# Patient Record
Sex: FEMALE | Race: WHITE | NOT HISPANIC OR LATINO | Employment: STUDENT | URBAN - METROPOLITAN AREA
[De-identification: names, ages, dates, MRNs, and addresses within clinical notes are randomized per-mention and may not be internally consistent; named-entity substitution may affect disease eponyms.]

---

## 2023-11-08 ENCOUNTER — OFFICE VISIT (OUTPATIENT)
Dept: HEMATOLOGY/ONCOLOGY | Facility: HOSPITAL | Age: 33
End: 2023-11-08
Payer: COMMERCIAL

## 2023-11-08 ENCOUNTER — LAB (OUTPATIENT)
Dept: LAB | Facility: HOSPITAL | Age: 33
End: 2023-11-08
Payer: COMMERCIAL

## 2023-11-08 VITALS
TEMPERATURE: 98.1 F | SYSTOLIC BLOOD PRESSURE: 115 MMHG | OXYGEN SATURATION: 99 % | WEIGHT: 130.95 LBS | BODY MASS INDEX: 21.05 KG/M2 | HEART RATE: 55 BPM | HEIGHT: 66 IN | DIASTOLIC BLOOD PRESSURE: 73 MMHG

## 2023-11-08 DIAGNOSIS — D22.5 MELANOCYTIC NEVI OF TRUNK: ICD-10-CM

## 2023-11-08 DIAGNOSIS — D47.3 ESSENTIAL (HEMORRHAGIC) THROMBOCYTHEMIA (MULTI): Primary | ICD-10-CM

## 2023-11-08 LAB
BASOPHILS # BLD AUTO: 0.05 X10*3/UL (ref 0–0.1)
BASOPHILS NFR BLD AUTO: 0.6 %
EOSINOPHIL # BLD AUTO: 0.11 X10*3/UL (ref 0–0.7)
EOSINOPHIL NFR BLD AUTO: 1.3 %
ERYTHROCYTE [DISTWIDTH] IN BLOOD BY AUTOMATED COUNT: 14 % (ref 11.5–14.5)
HCT VFR BLD AUTO: 37.3 % (ref 36–46)
HGB BLD-MCNC: 12.2 G/DL (ref 12–16)
HOLD SPECIMEN: NORMAL
IMM GRANULOCYTES # BLD AUTO: 0.16 X10*3/UL (ref 0–0.7)
IMM GRANULOCYTES NFR BLD AUTO: 1.9 % (ref 0–0.9)
LYMPHOCYTES # BLD AUTO: 2.05 X10*3/UL (ref 1.2–4.8)
LYMPHOCYTES NFR BLD AUTO: 24.5 %
MCH RBC QN AUTO: 30.1 PG (ref 26–34)
MCHC RBC AUTO-ENTMCNC: 32.7 G/DL (ref 32–36)
MCV RBC AUTO: 92 FL (ref 80–100)
MONOCYTES # BLD AUTO: 0.51 X10*3/UL (ref 0.1–1)
MONOCYTES NFR BLD AUTO: 6.1 %
NEUTROPHILS # BLD AUTO: 5.5 X10*3/UL (ref 1.2–7.7)
NEUTROPHILS NFR BLD AUTO: 65.6 %
NRBC BLD-RTO: 0 /100 WBCS (ref 0–0)
PLATELET # BLD AUTO: 1013 X10*3/UL (ref 150–450)
RBC # BLD AUTO: 4.05 X10*6/UL (ref 4–5.2)
WBC # BLD AUTO: 8.4 X10*3/UL (ref 4.4–11.3)

## 2023-11-08 PROCEDURE — 85025 COMPLETE CBC W/AUTO DIFF WBC: CPT

## 2023-11-08 PROCEDURE — 36415 COLL VENOUS BLD VENIPUNCTURE: CPT

## 2023-11-08 PROCEDURE — 99213 OFFICE O/P EST LOW 20 MIN: CPT | Performed by: INTERNAL MEDICINE

## 2023-11-08 PROCEDURE — 1036F TOBACCO NON-USER: CPT | Performed by: INTERNAL MEDICINE

## 2023-11-08 RX ORDER — EPINEPHRINE 0.3 MG/.3ML
INJECTION SUBCUTANEOUS
COMMUNITY
Start: 2023-02-20

## 2023-11-08 RX ORDER — FAMOTIDINE 20 MG/1
TABLET, FILM COATED ORAL
COMMUNITY
Start: 2023-02-20

## 2023-11-08 ASSESSMENT — PAIN SCALES - GENERAL: PAINLEVEL: 0-NO PAIN

## 2023-11-08 NOTE — PROGRESS NOTES
Patient ID: Arlyn Mackenzie is a 33 y.o. female.  [facility]    History of Present Illness:      ID Statement:    ARLYN MACKENZIE is a 32 year old Female        Chief Complaint: Thrombocytosis JAK2 mutation neg  x2, MPL positive.  JAK2 mutation neg x2, MPL positive.   Interval History:    Pt is a very pleasant 26 yo WF with h/o thrombocytosis (plts 1000-2000K since April) originally referred from Dr. Motley who was seen as a new pt on 1/5/15.   JAK2 mutation neg x2, MPL positive.  She presents to clinic for a bone marrow Bx accompanied by her mother.  Since her last visit she has been feeling well for the most part, but she did present to the ED on 1/9/15 with epigastric, pressure-like pain which she developed in the context of  URI.  Denied acid reflux symptoms.  CXR and U/S liver were unremarkable.  ECG normal and Tn negative.  The pain has improved; she did not take any medication for it.  She denies bleeding, bruising, fevers, chills, and night sweats.    Bone marrow biopsy performed on 1/20/15  confirms diagnosis of essential thrombocytosis.  She comes in today to discuss starting hydrea.     See VS, labs, and orders below.     ASSESSMENT:  26 yo WF with essential thrombocytosis (plts overall uptrending since 4/2014), JAK2 negative, MPL pending, diagnosis of essential thrombocytosis on bone marrow biopsy. Hydrea to be started today at 500mg oral daily.        New patient visit with Dr Sandoval April 1 2015  Patient and father present for visit  working full time in CT development at [employer]     Note recent Platelet count of 2.3M  Marrow result no chromosomal abn  marrow report  Results: 46,XX[20] FEMALE   --NORMOCELLULAR BONE MARROW WITH ATYPICAL MEGAKARYOCYTIC HYPERPLASIA, SEE NOTE. --ABSENT IRON STORES.   Megakaryocytes: increased megakaryocytes with enlarged hyperlobated nuclei      review these results with patient  and her father.. She has been completely asymptomatic. Notes that as a child and young adult she  did not have Port-A-Cath. Never had any difficulty with bleeding or clotting. Also noted no bruising even while playing  soccer has been on the birth control pills for many years is not sexually active at this time has been off birth control pills since August has not had normal periods since however we will use prophylaxis with sexual activity.  Has no symptomatology related to clotting or bleeding of any sort.     June 23 2015  CBC date/time       WBC     HGB     HCT     PLT     Neut      18-Jun-2015 17:04   8.6     12.4    39.3    1823(H) N/A     BMP date/time       NA    K     CL    CO2   BUN   CREAT   18-Jun-2015 17:04   141   4.4   106   N/A   8     0.96(H)     LDH date/time       LDH     18-Jun-2015 17:04   257(H)     off pill no periods from August     pain in chest intermittent, if press on it is uncomfortable.     during day not at night persistent.  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  exercise run 20 miles a week and lift twice a week up to 100 lb on back.  presses.  some small weight flys.     Raynauds during winter not during summer.     Nov 3 2015     CBC date/time       WBC     HGB     HCT     PLT     Neut      27-Oct-2015 11:09   9.3     13.2    41.4    1970(H) 5.27     BMP date/time       NA    K     CL    CO2   BUN   CREAT   27-Oct-2015 11:09   138   5.4(H)105   N/A   12    1.03(H)     LDH date/time       LDH     27-Oct-2015 11:09   261(H)     no raynauds not different  no issues with exercise  no bleeding no clotting  asa only occasionally taken  occas red specks in eyesight blurriness as well went away   March 1 2016  working full time [employer]   CT  occas headache  otherwise well.  no systems  ASA occasionally  no episodes of blurred vision  no change in appetite or abdominal discomfort.  LDH slightly high and K slightly high both from elevated Plt.     Plt 1841  LDH elevated.4 m     Juny 5 2016  feels well  no HA   occas bruise no limb  discomfort bleeding or  pains  no abd pain no weight change  plt 1899     other CBC is OK  basophils are slightly elevated at 130.  no skin disorder.  stable LDH slightly elevated.     Nov 22 2016  episodic numbness  no headaches  itching post shower  on back.  icthing perhaps ecxuma     not with sratching  likely mast cell induced platelet release  ASA use not at all  CBC  8.42 plt 1546 hct 38.4     July 19 2017     will start do AT [facility] [place]  NEXT WEEK AS do SCHOOL  CBC date/time       WBC     HGB     HCT     PLT     Neut      19-Jul-2017 13:22   10.8    13.0    39.5    1376(H) 8.06(H)  22-Nov-2016 09:27   8.4     12.4    38.4    1546(H) 4.86  05-Jul-2016 08:49   9.4     12.6    40.0    1899(H) 4.94     BMP date/time       NA    K     CL    CO2   BUN   CREAT   25-Nov-2016 09:10   139   5.8(H)104   N/A   18    1.02  22-Nov-2016 09:27   137   5.7(H)104   N/A   14    0.95  05-Jul-2016 08:49   139   6.0(H)103   N/A   20    1.26(H)     LDH date/time       LDH     22-Nov-2016 09:27   231  05-Jul-2016 08:49   285(H)  05-Jan-2016 07:28   262(H)        SNOW BLINDNESS SKIED DOWN MT AND HAD EXCESS SUN EXPOSURE AND OPTHAL STEROIDS AND TOLD   NO OTHER CLOTS OR BRUISES  MAY HAVE HAD CORNEAL ABRASION.  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  raynolds  numbness in foot.  toes and foot both  one occasion, took for the color to water exposure.  CBC date/time       WBC     HGB     HCT     PLT     Neut      22-Dec-2017 11:26   8.3     13.8    43.0    1359(H) 4.55  19-Jul-2017 13:22   10.8    13.0    39.5    1376(H) 8.06(H)  22-Nov-2016 09:27   8.4     12.4    38.4    1546(H) 4.86     BMP date/time       NA    K     CL    CO2   BUN   CREAT   22-Dec-2017 11:27   136   5.7(H)101   N/A   13    1.01      tried copper IUD bleeding during a test and extruded. in september.     not sexually active at the moment.  discussed alternatives of LD pill or another IUD  no HA or  visual disturbances      'Nov 27 2019  CBC date/time       WBC     HGB     HCT     PLT     Neut      27-Nov-2019 13:39   11.9(H) 13.2    42.1    1403(H) N/A  13-Jun-2018 12:46   8.4     12.7    39.0    1089(H) 5.11  22-Dec-2017 11:26   8.3     13.8    43.0    1359(H) 4.55     BMP date/time       NA              K               CL              CO2             BUN             CREAT             13-Jun-2018 12:46   139             4.8             104             N/A             13              1.07(H)  22-Dec-2017 11:27   136             5.7(H)          101             N/A             13               1.01  19-Jul-2017 13:22   137             5.2             105             N/A             16              1.02     LDH date/time       LDH     13-Jun-2018 12:46   274(H)  22-Dec-2017 11:27   261(H)  19-Jul-2017 13:22   287(H)  cold sensitivity.     nov 18 2020     CBC date/time       WBC     HGB     HCT     PLT     Neut      18-Nov-2020 15:13   10.9    14.3    43.2    1246(H) 7.58  27-Nov-2019 13:39   11.9(H) 13.2    42.1    1403(H) N/A  13-Jun-2018 12:46   8.4     12.7    39.0    1089(H) 5.11     BMP date/time       NA              K               CL              CO2             BUN             CREAT             18-Nov-2020 15:13   140             3.7             103             N/A             14              0.96  27-Nov-2019 13:39   136             6.1(HH)         100             N/A             13              0.99  13-Jun-2018 12:46   139             4.8             104             N/A             13              1.07(H)     LDH date/time       LDH     18-Nov-2020 15:13   268(H)  27-Nov-2019 13:39   731(H)  13-Jun-2018 12:46   274(H)     interviewing from  school   for EM and IM.     no sx n bleeding no headaches.     Nov 17 2021     CBC date/time       WBC     HGB     HCT     PLT     Neut      17-Nov-2021 13:21   9.0     13.8    42.4    1286(H) 6.09  18-Nov-2020 15:13   10.9    14.3    43.2    1246(H)  7.58  27-Nov-2019 13:39   11.9(H) 13.2    42.1    1403(H) N/A  13-Jun-2018 12:46   8.4     12.7    39.0    1089(H) 5.11  22-Dec-2017 11:26   8.3     13.8    43.0    1359(H) 4.55  19-Jul-2017 13:22   10.8    13.0    39.5    1376(H) 8.06(H)  22-Nov-2016 09:27   8.4     12.4    38.4    1546(H) 4.86     BMP date/time       NA              K               CL              CO2             BUN             CREAT             17-Nov-2021 13:21   138             4.4             101             N/A             20              1.14(H)  18-Nov-2020 15:13   140             3.7             103             N/A             14              0.96  27-Nov-2019 13:39   136             6.1(HH)         100             N/A             13              0.99  13-Jun-2018 12:46   139             4.8             104             N/A             13              1.07(H)  22-Dec-2017 11:27   136             5.7(H)          101             N/A             13               1.01  19-Jul-2017 13:22   137             5.2             105             N/A             16              1.02  25-Nov-2016 09:10   139             5.8(H)          104             N/A             18              1.02     LDH date/time       LDH     17-Nov-2021 13:21   275(H)   intern ER medicine [facility]     hands OK  no HA     baby ASA spotty for a few months  no bleedng\menses\ 40 day cycle  nml.  not ASA affected.  not on pill  thanks dry note slight creat 1.14     discussed vaccine and ET thrombosis risk     Dec 7     ASA low dose  occas HA  note hct drop  CBC date/time       WBC     HGB     HCT     PLT     Neut      07-Dec-2022 13:36   8.8     12.8    38.3    1120(H) 6.07  17-Nov-2021 13:21   9.0     13.8    42.4    1286(H) 6.09  18-Nov-2020 15:13   10.9    14.3    43.2    1246(H) 7.58     BMP date/time       NA              K               CL              CO2             BUN             CREAT             07-Dec-2022 13:35   140             4.3             104              N/A             14              1.06(H)  17-Nov-2021 13:21   138             4.4             101             N/A             20              1.14(H)  18-Nov-2020 15:13   140             3.7             103             N/A             14              0.96     LDH date/time       LDH     07-Dec-2022 13:35   264(H)  17-Nov-2021 13:21   275(H)  18-Nov-2020 15:13   268(H)     no sx stable plts.  no bone pain  Raynoids in cold  Emerg residency     linked to [employer]                                   Review of Systems:   ·  System Review All other systems have been reviewed and are negative for complaint.      · Constitutional NEGATIVE: Fever, Chills, Anorexia, Weight Loss, Malaise      ·  Eyes POSITIVE: Blurry Vision     NEGATIVE: Drainage, Diploplia, Redness, Vision Loss/ Change     Comments occas red flash likely plt clumps      · ENMT NEGATIVE: Nasal Discharge, Nasal Congestion, Ear Pain, Mouth Pain, Throat Pain      · Respiratory NEGATIVE: Productive Cough, Hemoptysis, Wheezing, Shortness of Breath      · Cardiology NEGATIVE: Chest Pain, Dyspnea on Exertion, Orthopnea, Palpitations, Syncope      · Gastrointestinal NEGATIVE: Abdominal Pain, Constipation, Diarrhea, Nausea, Vomiting      · Genitourinary NEGATIVE: Discharge, Dysuria, Flank Pain, Frequency, Hematuria      · Musculoskeletal NEGATIVE: Decreased ROM, Pain, Swelling, Stiffness, Weakness      · Neurological NEGATIVE: Dizziness, Confusion, Headache, Seizures, Syncope      · Psychiatric NEGATIVE: Mood Changes, Anxiety, Hallucinations, Sleep Changes, Suicidal Ideas      · Skin NEGATIVE: Mass, Pain, Pruritus, Rash, Ulcer     Comments occas welts indivisual itch      · Endocrine NEGATIVE: Heat Intolerance, Cold Intolerance, Sweat, Polyuria, Thirst      · Hematologic/Lymph NEGATIVE: Anemia, Bruising, Easy Bleeding, Night Sweats, Petechiae      · Allergic/Immunologic NEGATIVE: Anaphylaxis, Itchy/ Teary Eyes, Itching, Sneezing, Swelling      · Breast NEGATIVE: Pain, Mass,  Discharge, Nipple Itching, Gynecomastia         Allergies and Intolerances:       Allergies:         NKDA: Active         Nuts: Food, Anaphylaxis, Active         Peanuts: Food, Anaphylaxis, Active         Anaphylactic reaction to peanuts: Food, Anaphylaxis, Active     Outpatient Medication Profile:  * Patient Currently Takes Medications as of 07-Dec-2022 14:59 documented in Structured Notes         EpiPen 2-Nico 0.3 mg injectable kit : Last Dose Taken:  , 0.3 milliliter(s) injectable prn        Additional Comments: occas baby aspirin         Family History: No Family History items are recorded  in the problem list.      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 She has had no recent travel. She recently relocated  [place] and lives independently. She does admit to some alcohol exposure but no tobacco, and no admitted illegal drug use. She does have an active boyfriend, and her sexual history was not obtained.               Vitals and Measurements:   Vitals: Temp: 36.3  HR: 58  RR: 16  BP: 112/68  SPO2%:   100   Measurements: HT(cm): 167.2  WT(kg): 61.2  BSA: 1.68   BMI:  21.8      Physical Exam:      Constitutional: Well developed, awake/alert/oriented  x3, no distress, alert and cooperative   Eyes: PERRL, EOMI, clear sclera   ENMT: mucous membranes moist, no apparent injury,  no lesions seen   Head/Neck: Neck supple, no apparent injury, thyroid  without mass or tenderness, No JVD, trachea midline, no bruits   Respiratory/Thorax: Patent airways, CTAB, normal  breath sounds with good chest expansion, thorax symmetric   Cardiovascular: Regular, rate and rhythm, no murmurs,  2+ equal pulses of the extremities, normal S 1and S 2    Gastrointestinal: Nondistended, soft, non-tender,  no rebound tenderness or guarding, no masses palpable, no organomegaly, +BS, no bruits   Genitourinary: No Discharge, vesicles or other abnormalities   Musculoskeletal: ROM intact, no joint swelling, normal  strength   Extremities: normal extremities, no cyanosis edema,  contusions or wounds, no clubbing toes and fingers no sequella to raynaudes   Neurological: alert and oriented x3, intact senses,  motor, response and reflexes, normal strength   Breast: no breast lesion   Lymphatic: No significant lymphadenopathy no spleen  or nodes   Psychological: Appropriate mood and behavior   Skin: Warm and dry, no lesions, no rashes despite  history of heat and shower itching         Lab Results:     ·  Results        CBC date/time       WBC     HGB     HCT     PLT     Neut      07-Dec-2022 13:36   8.8     12.8    38.3    1120(H) 6.07  17-Nov-2021 13:21   9.0     13.8    42.4    1286(H) 6.09  18-Nov-2020 15:13   10.9    14.3    43.2    1246(H) 7.58     BMP date/time       NA              K               CL              CO2             BUN             CREAT             07-Dec-2022 13:35   140             4.3             104             N/A             14              1.06(H)  17-Nov-2021 13:21   138             4.4             101             N/A             20              1.14(H)  18-Nov-2020 15:13   140             3.7             103             N/A             14              0.96     LDH date/time       LDH     07-Dec-2022 13:35   264(H)  17-Nov-2021 13:21   275(H)  18-Nov-2020 15:13   268(H)     Assessment and Plan:      Assessment and Plan:   Assessment:    Essential Thrombocythemia clinically stable no need for intervention will continue to monitor at 6 m intervals.  adverse experience with copper 7  IUD 50 d menses  on could use advice on BCP and other IUD will research and contact.  occas use of baby ASA  discussed Saumya Velasquez as consult pre pregnancy for plt  "testing     slight drop in HCT  Plan:    Essential Thrombocythemia clinically stable no need for intervention will continue to moniitor at 6 m intervals.  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           Patient Instructions:      Instructions:    high platelet count  would advise IUD as birth control  monitor cold finger symptoms from Raynauds and seek ways to warm fingers in winter.  Heated steering wheel.  for instance.  has options to take ASA up to  twice a week  blurred vision may require retinal expert and exam  not recent  but follow for now     4 m FU with CBC Chem LDH     stable no intervention other than ASA     suggested ASA for cold raynauds.      Nov 8  [facility]  ER third then at [facility] ER        Has L chest skin:  Final Diagnosis    A. \"Skin, Left lateral chest\"     Intradermal nevus.     Uncomfortable. Needs resection  CBC   Same  Plt 1013    K5.8 likely from platelets.  ASA daily   No bleeding   Nml menses    No head aches ongoing raynouds  PE tip spleen not noted previosuly or nodes    Plan Essential Thrombocythemia clinically stable no need for intervention will continue to moniitor at 6 m intervals.  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            Note Recipients: Adam Villela MD - [phone]  Saumya Velasquez MD - [phone]   Select \"Yes\" when ready to send to Provider(s) Listed Above:  Note sent to providers named above         Electronic Signatures:  Emery Sandoval)  (Signed 07-Dec-2022 15:25)          Authored: History of Present Illness, Review of Systems,  Allergies and Outpatient Medication Profile, Problem List, Social History, Performance Assessments, Vitals and Measurements, Physical Exam, Results, Assessment and Plan, Patient Instructions, To Send Document via Auto Fax, Attestation        Last Updated: 07-Dec-2022 15:25 by Emery Sandoval)               Last signed by: Emery Sandoval at 12/7/2022  3:25 PM   Electronically signed by Emery Sandoval at 12/7/2022  3:25 " "PM  Subjective    HPI      Objective    BSA: 1.66 meters squared  /73   Pulse 55   Temp 36.7 °C (98.1 °F)   Ht (S) 1.673 m (5' 5.87\")   Wt 59.4 kg (130 lb 15.3 oz)   SpO2 99%   BMI 21.22 kg/m²      Physical Exam  3 lb loss fron 1 y ago  Active working as MD.      Performance Status:  Asymptomatic      Assessment/Plan       Cancer Staging   No matching staging information was found for the patient.    Oncology History    No history exists.        Diagnoses and all orders for this visit:  Essential (hemorrhagic) thrombocythemia (CMS/HCC)  -     CBC and Auto Differential; Future  -     Comprehensive Metabolic Panel; Future  -     Lactate Dehydrogenase; Future  -     Hepatic Function Panel; Future  -     Clinic Appointment Request; Future  CLL (chronic lymphocytic leukemia) (CMS/HCC)  Melanocytic nevi of trunk  -     CBC and Auto Differential; Future           Emery Sandoval MD            "

## 2024-11-06 ENCOUNTER — OFFICE VISIT (OUTPATIENT)
Dept: HEMATOLOGY/ONCOLOGY | Facility: HOSPITAL | Age: 34
End: 2024-11-06

## 2024-11-06 ENCOUNTER — LAB (OUTPATIENT)
Dept: LAB | Facility: HOSPITAL | Age: 34
End: 2024-11-06

## 2024-11-06 VITALS
RESPIRATION RATE: 16 BRPM | OXYGEN SATURATION: 98 % | DIASTOLIC BLOOD PRESSURE: 72 MMHG | HEART RATE: 69 BPM | SYSTOLIC BLOOD PRESSURE: 118 MMHG | TEMPERATURE: 97.7 F | WEIGHT: 130.3 LBS | BODY MASS INDEX: 21.12 KG/M2

## 2024-11-06 DIAGNOSIS — D47.3 ESSENTIAL (HEMORRHAGIC) THROMBOCYTHEMIA: ICD-10-CM

## 2024-11-06 LAB
ALBUMIN SERPL BCP-MCNC: 4.4 G/DL (ref 3.4–5)
ALP SERPL-CCNC: 66 U/L (ref 33–110)
ALT SERPL W P-5'-P-CCNC: 13 U/L (ref 7–45)
ANION GAP SERPL CALC-SCNC: 11 MMOL/L (ref 10–20)
AST SERPL W P-5'-P-CCNC: 32 U/L (ref 9–39)
BASOPHILS # BLD AUTO: 0.1 X10*3/UL (ref 0–0.1)
BASOPHILS NFR BLD AUTO: 0.9 %
BILIRUB DIRECT SERPL-MCNC: 0 MG/DL (ref 0–0.3)
BILIRUB SERPL-MCNC: 0.5 MG/DL (ref 0–1.2)
BUN SERPL-MCNC: 11 MG/DL (ref 6–23)
CALCIUM SERPL-MCNC: 9.5 MG/DL (ref 8.6–10.3)
CHLORIDE SERPL-SCNC: 103 MMOL/L (ref 98–107)
CO2 SERPL-SCNC: 28 MMOL/L (ref 21–32)
CREAT SERPL-MCNC: 0.92 MG/DL (ref 0.5–1.05)
EGFRCR SERPLBLD CKD-EPI 2021: 84 ML/MIN/1.73M*2
EOSINOPHIL # BLD AUTO: 0.17 X10*3/UL (ref 0–0.7)
EOSINOPHIL NFR BLD AUTO: 1.6 %
ERYTHROCYTE [DISTWIDTH] IN BLOOD BY AUTOMATED COUNT: 14.6 % (ref 11.5–14.5)
GLUCOSE SERPL-MCNC: 83 MG/DL (ref 74–99)
HCT VFR BLD AUTO: 37.4 % (ref 36–46)
HGB BLD-MCNC: 12.5 G/DL (ref 12–16)
IMM GRANULOCYTES # BLD AUTO: 0.15 X10*3/UL (ref 0–0.7)
IMM GRANULOCYTES NFR BLD AUTO: 1.4 % (ref 0–0.9)
LDH SERPL L TO P-CCNC: 260 U/L (ref 84–246)
LYMPHOCYTES # BLD AUTO: 2.16 X10*3/UL (ref 1.2–4.8)
LYMPHOCYTES NFR BLD AUTO: 20.2 %
MCH RBC QN AUTO: 30.5 PG (ref 26–34)
MCHC RBC AUTO-ENTMCNC: 33.4 G/DL (ref 32–36)
MCV RBC AUTO: 91 FL (ref 80–100)
MONOCYTES # BLD AUTO: 0.61 X10*3/UL (ref 0.1–1)
MONOCYTES NFR BLD AUTO: 5.7 %
NEUTROPHILS # BLD AUTO: 7.51 X10*3/UL (ref 1.2–7.7)
NEUTROPHILS NFR BLD AUTO: 70.2 %
NRBC BLD-RTO: 0 /100 WBCS (ref 0–0)
PLATELET # BLD AUTO: 1184 X10*3/UL (ref 150–450)
POTASSIUM SERPL-SCNC: 4.2 MMOL/L (ref 3.5–5.3)
PROT SERPL-MCNC: 6.7 G/DL (ref 6.4–8.2)
RBC # BLD AUTO: 4.1 X10*6/UL (ref 4–5.2)
SODIUM SERPL-SCNC: 138 MMOL/L (ref 136–145)
WBC # BLD AUTO: 10.7 X10*3/UL (ref 4.4–11.3)

## 2024-11-06 PROCEDURE — 84075 ASSAY ALKALINE PHOSPHATASE: CPT

## 2024-11-06 PROCEDURE — 85025 COMPLETE CBC W/AUTO DIFF WBC: CPT

## 2024-11-06 PROCEDURE — 82248 BILIRUBIN DIRECT: CPT

## 2024-11-06 PROCEDURE — 83615 LACTATE (LD) (LDH) ENZYME: CPT

## 2024-11-06 PROCEDURE — 99213 OFFICE O/P EST LOW 20 MIN: CPT | Performed by: INTERNAL MEDICINE

## 2024-11-06 PROCEDURE — 36415 COLL VENOUS BLD VENIPUNCTURE: CPT

## 2024-11-06 ASSESSMENT — PAIN SCALES - GENERAL: PAINLEVEL_OUTOF10: 0-NO PAIN

## 2024-11-06 NOTE — PROGRESS NOTES
Consent:      Patient ID: Arlyn Ying is a 34 y.o. female.    Subjective    HPIstarted as attending  [facility]  ER 15a month  No abnml menses  No HA  Occas stress HA  No blurred  vision  Note CBC  Heme ct 37.2  Plt 1148 wbc 10.7 ANC7,4  All consistent with chronic phase essential thrombocythemia assymptomotic.    ASA daily  No bleeding or clotting    Objective    BSA: 1.66 meters squared  /72 (BP Location: Left arm, Patient Position: Sitting, BP Cuff Size: Adult)   Pulse 69   Temp 36.5 °C (97.7 °F) (Skin)   Resp 16   Wt 59.1 kg (130 lb 4.8 oz)   SpO2 98%   BMI 21.12 kg/m²      Physical Exam  Spleen edge not tethered not painful    Performance Status:  Symptomatic; fully ambulatory    Assessment/Plan    Oncology History    No history exists.   [facility] or [facility] at [facility] for insurance    Derm multiple moles     Diagnoses and all orders for this visit:  Essential (hemorrhagic) thrombocythemia  -     Clinic Appointment Request           Emery Sandoval MD